# Patient Record
Sex: FEMALE | Race: AMERICAN INDIAN OR ALASKA NATIVE
[De-identification: names, ages, dates, MRNs, and addresses within clinical notes are randomized per-mention and may not be internally consistent; named-entity substitution may affect disease eponyms.]

---

## 2021-08-10 ENCOUNTER — HOSPITAL ENCOUNTER (EMERGENCY)
Dept: HOSPITAL 5 - ED | Age: 51
Discharge: HOME | End: 2021-08-10
Payer: MEDICAID

## 2021-08-10 VITALS — DIASTOLIC BLOOD PRESSURE: 95 MMHG | SYSTOLIC BLOOD PRESSURE: 155 MMHG

## 2021-08-10 DIAGNOSIS — J20.9: Primary | ICD-10-CM

## 2021-08-10 DIAGNOSIS — G43.909: ICD-10-CM

## 2021-08-10 DIAGNOSIS — J45.909: ICD-10-CM

## 2021-08-10 LAB
ALBUMIN SERPL-MCNC: 4 G/DL (ref 3.9–5)
ALT SERPL-CCNC: 9 UNITS/L (ref 7–56)
BASOPHILS # (AUTO): 0.1 K/MM3 (ref 0–0.1)
BASOPHILS NFR BLD AUTO: 1.1 % (ref 0–1.8)
BUN SERPL-MCNC: 11 MG/DL (ref 7–17)
BUN/CREAT SERPL: 14 %
CALCIUM SERPL-MCNC: 8.5 MG/DL (ref 8.4–10.2)
EOSINOPHIL # BLD AUTO: 0.2 K/MM3 (ref 0–0.4)
EOSINOPHIL NFR BLD AUTO: 3.2 % (ref 0–4.3)
HCT VFR BLD CALC: 26.4 % (ref 30.3–42.9)
HEMOLYSIS INDEX: 2
HGB BLD-MCNC: 7.5 GM/DL (ref 10.1–14.3)
LYMPHOCYTES # BLD AUTO: 1.3 K/MM3 (ref 1.2–5.4)
LYMPHOCYTES NFR BLD AUTO: 19.9 % (ref 13.4–35)
MCHC RBC AUTO-ENTMCNC: 28 % (ref 30–34)
MCV RBC AUTO: 61 FL (ref 79–97)
MONOCYTES # (AUTO): 0.7 K/MM3 (ref 0–0.8)
MONOCYTES % (AUTO): 11.4 % (ref 0–7.3)
PLATELET # BLD: 261 K/MM3 (ref 140–440)
RBC # BLD AUTO: 4.31 M/MM3 (ref 3.65–5.03)

## 2021-08-10 PROCEDURE — 71045 X-RAY EXAM CHEST 1 VIEW: CPT

## 2021-08-10 PROCEDURE — 85025 COMPLETE CBC W/AUTO DIFF WBC: CPT

## 2021-08-10 PROCEDURE — 99284 EMERGENCY DEPT VISIT MOD MDM: CPT

## 2021-08-10 PROCEDURE — 80053 COMPREHEN METABOLIC PANEL: CPT

## 2021-08-10 PROCEDURE — 96375 TX/PRO/DX INJ NEW DRUG ADDON: CPT

## 2021-08-10 PROCEDURE — 36415 COLL VENOUS BLD VENIPUNCTURE: CPT

## 2021-08-10 PROCEDURE — 96365 THER/PROPH/DIAG IV INF INIT: CPT

## 2021-08-10 NOTE — EMERGENCY DEPARTMENT REPORT
ED Shortness of Breath HPI





- General


Chief Complaint: Dyspnea/Respdistress


Stated Complaint: ASTHMA


Source: patient


Mode of arrival: Ambulatory


Limitations: No Limitations





- History of Present Illness


Initial Comments: 





Patient is a 49-year-old -American female with a history of asthma, 

migraine headaches, iron deficiency anemia and tobacco abuse who presents to the

ED with complaint of acute onset persistent shortness of breath, chest 

tightness, wheezing, persistent dry cough for the last 5 days despite using her 

albuterol inhaler at home.  Patient states that her symptoms got worse the last 

24 hours.  Patient also states that she ran out of her albuterol nebulizers at 

home.  Patient denies nausea, vomiting, dizziness, syncope, fever, chills, chest

pain, nasal and sinus congestion, sore throat, abdominal pain, diarrhea, 

headache, change in vision, back pain or palpitations.


MD Complaint: shortness of breath, cough, "asthma attack"


-: Sudden, days(s) (5)


Severity: moderate


Pain Scale: 5


Quality: sharp


Consistency: intermittent


Improves With: nothing


Worsens With: nothing


Known History Of: asthma


Context: allergen exposure


Associated Symptoms: denies other symptoms, cough


Treatments Prior to Arrival: bronchodilator





- Related Data


Home Oxygen Therapy: No


                                  Previous Rx's











 Medication  Instructions  Recorded  Last Taken  Type


 


ALBUTEROL NEB's [Proventil 0.083% 3 ml IH Q6H PRN #75 ml 08/10/21 Unknown Rx





NEBS]    


 


Albuterol Sulfate [Proventil Hfa] 1 - 2 puff IH Q6H PRN #1 hfa.aer.ad 08/10/21 

Unknown Rx


 


Azithromycin [Zithromax Z-MARINA] 250 mg PO DAILY #6 tablet 08/10/21 Unknown Rx


 


Benzonatate [Tessalon Perles] 100 mg PO Q8HR #30 capsule 08/10/21 Unknown Rx


 


Ibuprofen [Motrin] 600 mg PO Q8H PRN #30 tablet 08/10/21 Unknown Rx


 


Montelukast [Singulair] 10 mg PO QPM #30 tablet 08/10/21 Unknown Rx


 


methylPREDNISolone [Medrol 4MG 4 mg PO DAILY #21 tab.ds.pk 08/10/21 Unknown Rx





DOSEPAK (21 tabs)]    











                                    Allergies











Allergy/AdvReac Type Severity Reaction Status Date / Time


 


sulfamethoxazole Allergy  Hives Verified 08/10/21 17:11





[From Bactrim]     


 


trimethoprim [From Bactrim] Allergy  Hives Verified 08/10/21 17:11














ED Review of Systems


ROS: 


Stated complaint: ASTHMA


Other details as noted in HPI





Constitutional: malaise.  denies: chills, fever


Eyes: denies: eye pain, eye discharge, vision change


ENT: denies: ear pain, throat pain


Respiratory: cough, shortness of breath, wheezing


Cardiovascular: chest pain (chest tightness).  denies: palpitations


Endocrine: no symptoms reported


Gastrointestinal: denies: abdominal pain, nausea, vomiting, diarrhea


Genitourinary: denies: urgency, dysuria, discharge


Musculoskeletal: denies: back pain, joint swelling, arthralgia


Skin: denies: rash, lesions


Neurological: denies: headache, weakness, paresthesias


Psychiatric: denies: anxiety, depression


Hematological/Lymphatic: denies: easy bleeding, easy bruising





ED Past Medical Hx





- Past Medical History


Previous Medical History?: Yes


Hx Headaches / Migraines: Yes


Hx Asthma: Yes


Additional medical history: anemia





- Surgical History


Past Surgical History?: Yes


Additional Surgical History: ,





- Medications


Home Medications: 


                                Home Medications











 Medication  Instructions  Recorded  Confirmed  Last Taken  Type


 


ALBUTEROL NEB's [Proventil 0.083% 3 ml IH Q6H PRN #75 ml 08/10/21  Unknown Rx





NEBS]     


 


Albuterol Sulfate [Proventil Hfa] 1 - 2 puff IH Q6H PRN #1 hfa.aer.ad 08/10/21  

Unknown Rx


 


Azithromycin [Zithromax Z-MARINA] 250 mg PO DAILY #6 tablet 08/10/21  Unknown Rx


 


Benzonatate [Tessalon Perles] 100 mg PO Q8HR #30 capsule 08/10/21  Unknown Rx


 


Ibuprofen [Motrin] 600 mg PO Q8H PRN #30 tablet 08/10/21  Unknown Rx


 


Montelukast [Singulair] 10 mg PO QPM #30 tablet 08/10/21  Unknown Rx


 


methylPREDNISolone [Medrol 4MG 4 mg PO DAILY #21 tab.ds.pk 08/10/21  Unknown Rx





DOSEPAK (21 tabs)]     














ED Physical Exam





- General


Limitations: No Limitations


General appearance: alert, in no apparent distress





- Head


Head exam: Present: atraumatic, normocephalic, normal inspection





- Eye


Eye exam: Present: normal appearance, PERRL, EOMI





- ENT


ENT exam: Present: normal orophraynx, mucous membranes moist, TM's normal bilate

rally, normal external ear exam, other (Grossly congested nasal passages)





- Neck


Neck exam: Present: normal inspection, full ROM





- Respiratory


Respiratory exam: Present: wheezes (Diffuse coarse wheezes throughout).  Absent:

respiratory distress, rales, rhonchi, chest wall tenderness, accessory muscle 

use, decreased breath sounds, prolonged expiratory





- Cardiovascular


Cardiovascular Exam: Present: normal rhythm, tachycardia, normal heart sounds.  

Absent: systolic murmur, diastolic murmur, rubs, gallop





- GI/Abdominal


GI/Abdominal exam: Present: soft, normal bowel sounds.  Absent: tenderness, 

guarding, rebound, hyperactive bowel sounds, hypoactive bowel sounds, 

organomegaly





- Extremities Exam


Extremities exam: Present: normal inspection, full ROM, normal capillary refill





- Back Exam


Back exam: Present: normal inspection, full ROM.  Absent: tenderness, CVA t

enderness (R), CVA tenderness (L), muscle spasm, paraspinal tenderness, 

vertebral tenderness





- Neurological Exam


Neurological exam: Present: alert, oriented X3, CN II-XII intact, normal gait, 

reflexes normal





- Psychiatric


Psychiatric exam: Present: normal affect, normal mood





- Skin


Skin exam: Present: warm, dry, intact, normal color.  Absent: rash





ED Course


                                   Vital Signs











  08/10/21





  17:10


 


Temperature 97.8 F


 


Pulse Rate 100 H


 


Respiratory 18





Rate 


 


Blood Pressure 155/95


 


O2 Sat by Pulse 98





Oximetry 














ED Medical Decision Making





- Lab Data


Result diagrams: 


                                 08/10/21 17:50





                                 08/10/21 17:50





- Radiology Data


Radiology results: report reviewed, image reviewed





[facility]  


                                     [address] [place] [address]  


 


                                            XRay Report   


                                               Signed  


 


Patient: LEELEE RICARDO                                                        

       MR#: [id number]  


[id number]          


: 1971                                                                

Acct:[id number]      


 


Age/Sex: 49 / F                                                                

ADM Date: 08/10/21     


 


Loc: ED       


Attending Dr:   


 


 


Ordering Physician: LAVONNE GODWIN  


Date of Service: 08/10/21  


Procedure(s): XR chest 1V ap  


Accession Number(s): [id number]  


 


cc: LAVONNE GODWIN   


 


Fluoro Time In Minutes:   


 


XR chest 1V ap  


 


 INDICATION / CLINICAL INFORMATION:  


 DYSPNEA, ASTHMA  


 


 COMPARISON:   


 None available.  


 


 FINDINGS:  


 


 SUPPORT DEVICES: None.  


 


 HEART / MEDIASTINUM: No significant abnormality.   


 


 LUNGS / PLEURA: Lungs are clear.  Costophrenic sulci are sharp. No 

pneumothorax.  


 


 ADDITIONAL FINDINGS: No significant additional findings.  


 


 IMPRESSION:  


 1. No acute findings.  


 


 Signer Name: Chas Muniz MD   


 Signed: 8/10/2021 5:54 PM  


 Workstation Name: [id number]   


 


 


Transcribed By: CS  


Dictated By: Chas Muniz MD  


Electronically Authenticated By: Chas Muniz MD    


Signed Date/Time: 08/10/21 1754                                


 


 


 


DD/DT: 08/10/21 1754                                                            

 


TD/TT:





























- Medical Decision Making





This is a 49-year-old -American female with a history of asthma, migraine

headaches, iron deficiency anemia and tobacco abuse who presents to the ED with 

complaint of acute onset persistent shortness of breath, chest tightness, 

wheezing, persistent dry cough for the last 5 days despite using her albuterol 

inhaler at home.  Patient states that her symptoms got worse the last 24 hours. 

Patient also states that she ran out of her albuterol nebulizers at home.  In 

the ED, patient is alert and oriented x3 and is not in any distress but afebrile

and tachycardic in triage.  Chest x-ray shows no acute cardiopulmonary 

abnormalities or pneumonitis.  Lab test results were reviewed and are all 

nonactionable.  Patient received DuoNeb treatment in the ED, also received Solu-

Medrol 125 mg IV x1 as well as magnesium 2 g IV x1 with normal saline 1 L IV 

bolus.  On reevaluation, patient felt better, oxygen saturation is 100% and 

wheezing resolved as well.  Patient was therefore discharged home on medications

and advised to follow-up with her primary care physician in 5 to 7 days for 

reevaluation or return to the ED immediately if symptoms get worse.  Patient was

also counseled on the importance of quitting tobacco abuse to improve on her 

symptoms.  Patient verbalized understanding.





- Differential Diagnosis


asthma; bronchitis; pneumonia; URI


Critical care attestation.: 


If time is entered above; I have spent that time in minutes in the direct care 

of this critically ill patient, excluding procedure time.








ED Disposition


Clinical Impression: 


 Acute bronchitis with asthma with acute exacerbation, Shortness of breath





Disposition:  TO HOME OR SELFCARE


Is pt being admited?: No


Does the pt Need Aspirin: No


Condition: Stable


Instructions:  Shortness of Breath, Adult, Easy-to-Read, Cough, Adult, 

Easy-to-Read, Acute Bronchitis, Adult, Easy-to-Read, Asthma, Adult, 

Easy-to-Read, Acute Bronchitis (ED)


Additional Instructions: 


All lab test results were reviewed and are all nonactionable.  Chest x-ray shows

no acute cardiopulmonary abnormalities or pneumonitis.  Your symptoms are likely

due to acute exacerbation of your asthma or bronchitis.  Therefore take 

medications with food, drink plenty of fluids and follow-up with your primary c

are physician in 5 to 7 days for reevaluation.  Return to the ED immediately if 

symptoms get worse.


Prescriptions: 


methylPREDNISolone [Medrol 4MG DOSEPAK (21 tabs)] 4 mg PO DAILY #21 tab.ds.pk


Ibuprofen [Motrin] 600 mg PO Q8H PRN #30 tablet


 PRN Reason: Pain


ALBUTEROL NEB's [Proventil 0.083% NEBS] 3 ml IH Q6H PRN #75 ml


 PRN Reason: Wheezing


Albuterol Sulfate [Proventil Hfa] 1 - 2 puff IH Q6H PRN #1 hfa.aer.ad


 PRN Reason: Shortness Of Breath


Montelukast [Singulair] 10 mg PO QPM #30 tablet


Benzonatate [Tessalon Perles] 100 mg PO Q8HR #30 capsule


Azithromycin [Zithromax Z-MARINA] 250 mg PO DAILY #6 tablet


Referrals: 


PAUL OJEDA MD [Staff Physician] - 7-10 days


Time of Disposition: 19:48


Print Language: ENGLISH

## 2021-08-10 NOTE — XRAY REPORT
XR chest 1V ap



INDICATION / CLINICAL INFORMATION:

DYSPNEA, ASTHMA



COMPARISON: 

None available.



FINDINGS:



SUPPORT DEVICES: None.



HEART / MEDIASTINUM: No significant abnormality. 



LUNGS / PLEURA: Lungs are clear.  Costophrenic sulci are sharp. No pneumothorax.



ADDITIONAL FINDINGS: No significant additional findings.



IMPRESSION:

1. No acute findings.



Signer Name: Chas Muniz MD 

Signed: 8/10/2021 5:54 PM

Workstation Name: [id number]

## 2021-12-01 ENCOUNTER — HOSPITAL ENCOUNTER (EMERGENCY)
Dept: HOSPITAL 5 - ED | Age: 51
Discharge: HOME | End: 2021-12-01
Payer: MEDICAID

## 2021-12-01 VITALS — DIASTOLIC BLOOD PRESSURE: 78 MMHG | SYSTOLIC BLOOD PRESSURE: 189 MMHG

## 2021-12-01 DIAGNOSIS — L02.91: Primary | ICD-10-CM

## 2021-12-01 PROCEDURE — 99282 EMERGENCY DEPT VISIT SF MDM: CPT

## 2021-12-01 PROCEDURE — 99283 EMERGENCY DEPT VISIT LOW MDM: CPT

## 2021-12-01 NOTE — EMERGENCY DEPARTMENT REPORT
ED General Adult HPI





- General


Chief complaint: Urogenital-Female


Stated complaint: VAGINAL PROBLEM


Time Seen by Provider: 21 12:28


Source: patient


Mode of arrival: Ambulatory


Limitations: No Limitations





- History of Present Illness


Initial comments: 





Is a pleasant 51-year-old female presents emergency department chief complaint 

of a painful bump above her vagina has been present for the past few days.  She 

has a history of abscess here in the past and has been trying home remedies with

no relief of her symptoms.  She denies any associated fever, chills or night 

sweats, headache, dizziness, or vision, nausea, vomiting, diarrhea, chest pain, 

shortness of breath, weakness or any other associated symptoms.





- Related Data


                                  Previous Rx's











 Medication  Instructions  Recorded  Last Taken  Type


 


ALBUTEROL NEB's [Proventil 0.083% 3 ml IH Q6H PRN #75 ml 08/10/21 Unknown Rx





NEBS]    


 


Albuterol Sulfate [Proventil Hfa] 1 - 2 puff IH Q6H PRN #1 hfa.aer.ad 08/10/21 

Unknown Rx


 


Azithromycin [Zithromax Z-MARINA] 250 mg PO DAILY #6 tablet 08/10/21 Unknown Rx


 


Benzonatate [Tessalon Perles] 100 mg PO Q8HR #30 capsule 08/10/21 Unknown Rx


 


Ibuprofen [Motrin] 600 mg PO Q8H PRN #30 tablet 08/10/21 Unknown Rx


 


Montelukast [Singulair] 10 mg PO QPM #30 tablet 08/10/21 Unknown Rx


 


methylPREDNISolone [Medrol 4MG 4 mg PO DAILY #21 tab.ds.pk 08/10/21 Unknown Rx





DOSEPAK (21 tabs)]    


 


Acetaminophen/Codeine [Tylenol 1 tab PO Q6H PRN #12 tab 21 Unknown Rx





/Codeine # 3 tab]    


 


Clindamycin [Clindamycin CAP] 300 mg PO Q6H #40 capsule 21 Unknown Rx


 


Triamcinolone 0.5% [Kenalog 0.5% 1 applic TP TID #1 tube 21 Unknown Rx





CREAM]    











                                    Allergies











Allergy/AdvReac Type Severity Reaction Status Date / Time


 


sulfamethoxazole Allergy  Hives Verified 08/10/21 17:11





[From Bactrim]     


 


trimethoprim [From Bactrim] Allergy  Hives Verified 08/10/21 17:11














ED Review of Systems


ROS: 


Stated complaint: VAGINAL PROBLEM


Other details as noted in HPI





Comment: All other systems reviewed and negative


Constitutional: denies: chills, fever


Eyes: denies: eye pain, eye discharge, vision change


ENT: denies: ear pain, throat pain


Respiratory: denies: cough, shortness of breath, wheezing


Cardiovascular: denies: chest pain, palpitations


Endocrine: no symptoms reported


Gastrointestinal: denies: abdominal pain, nausea, diarrhea


Genitourinary: denies: urgency, dysuria, discharge


Musculoskeletal: denies: back pain, joint swelling, arthralgia


Skin: as per HPI.  denies: rash, lesions


Neurological: denies: headache, weakness, paresthesias


Psychiatric: denies: anxiety, depression


Hematological/Lymphatic: denies: easy bleeding, easy bruising





ED Past Medical Hx





- Past Medical History


Previous Medical History?: Yes


Hx Headaches / Migraines: Yes


Hx Asthma: Yes


Additional medical history: anemia





- Surgical History


Additional Surgical History: ,





- Medications


Home Medications: 


                                Home Medications











 Medication  Instructions  Recorded  Confirmed  Last Taken  Type


 


ALBUTEROL NEB's [Proventil 0.083% 3 ml IH Q6H PRN #75 ml 08/10/21  Unknown Rx





NEBS]     


 


Albuterol Sulfate [Proventil Hfa] 1 - 2 puff IH Q6H PRN #1 hfa.aer.ad 08/10/21  

Unknown Rx


 


Azithromycin [Zithromax Z-MARINA] 250 mg PO DAILY #6 tablet 08/10/21  Unknown Rx


 


Benzonatate [Tessalon Perles] 100 mg PO Q8HR #30 capsule 08/10/21  Unknown Rx


 


Ibuprofen [Motrin] 600 mg PO Q8H PRN #30 tablet 08/10/21  Unknown Rx


 


Montelukast [Singulair] 10 mg PO QPM #30 tablet 08/10/21  Unknown Rx


 


methylPREDNISolone [Medrol 4MG 4 mg PO DAILY #21 tab.ds.pk 08/10/21  Unknown Rx





DOSEPAK (21 tabs)]     


 


Acetaminophen/Codeine [Tylenol 1 tab PO Q6H PRN #12 tab 21  Unknown Rx





/Codeine # 3 tab]     


 


Clindamycin [Clindamycin CAP] 300 mg PO Q6H #40 capsule 21  Unknown Rx


 


Triamcinolone 0.5% [Kenalog 0.5% 1 applic TP TID #1 tube 21  Unknown Rx





CREAM]     














ED Physical Exam





- General


Limitations: No Limitations


General appearance: alert, in no apparent distress





- Head


Head exam: Present: atraumatic, normocephalic





- Eye


Eye exam: Present: normal appearance, PERRL, EOMI


Pupils: Present: normal accommodation





- ENT


ENT exam: Present: normal exam, normal orophraynx, mucous membranes moist





- Neck


Neck exam: Present: normal inspection, full ROM.  Absent: tenderness, 

meningismus





- Respiratory


Respiratory exam: Present: normal lung sounds bilaterally.  Absent: respiratory 

distress, wheezes, rales, rhonchi, stridor





- Cardiovascular


Cardiovascular Exam: Present: regular rate, normal rhythm, normal heart sounds. 

Absent: systolic murmur, diastolic murmur, rubs, gallop





- GI/Abdominal


GI/Abdominal exam: Present: soft, normal bowel sounds.  Absent: distended, 

tenderness, guarding, rebound, rigid





- 


External exam: Present: swelling, other (There is a 3 cm fluctuant mass just 

above the vagina.  Exam chaperoned by MRACO Stevenson)





- Extremities Exam


Extremities exam: Present: normal inspection





- Back Exam


Back exam: Present: normal inspection





- Neurological Exam


Neurological exam: Present: alert, oriented X3





- Psychiatric


Psychiatric exam: Present: normal affect, normal mood





- Skin


Skin exam: Present: warm, dry, intact, normal color.  Absent: rash





ED Course


                                   Vital Signs











  21





  12:17


 


Temperature 98.3 F


 


Pulse Rate 86


 


Respiratory 16





Rate 


 


Blood Pressure 189/78





[Right] 


 


O2 Sat by Pulse 100





Oximetry 














- I & D


  ** Vagina


Type of Procedure: Complex


Blade Size: 11


I & D Procedure: betadine prep, sterile drapes applied, sterile dressing 

applied, gauze wick placed


Progress: 





The patient gave verbal consent.  I then cleaned the area with Betadine prep and

 anesthetized the area with 1% lidocaine without epinephrine.  1/2 cm incision 

was then made over the area of most fluctuance and a large amount of purulent 

drainage was expressed.  There was pain with the loculation the patient did not 

tolerate any further loculation or irrigation.  Suspect there may be another 

pocket of infection there but the patient would declined to allow me to do any 

further expression or the loculation due to pain.  I offered to renumb but she 

declined.  I did pack the remaining wound with quarter inch iodoform gauze which

 she tolerated well.  The procedure was tolerated with difficulty but overall 

large amount of pus was drained.  There was less than 5 mL of blood loss. 





ED Medical Decision Making





- Medical Decision Making





Abscess was drained although I suspect there may be one more pocket of pus in 

the wound the patient would not allow me to further deloculated.  She will be 

treated with clindamycin, warm compresses, Tylenol with codeine for pain and 

topical triamcinolone due to history of eczema that she reports this is helped 

her in the past.  She understood that it is possible there may be another pocket

 of pus and this may get worse again and that she may need to follow-up with 

either her OB/GYN or primary care doctor or surgeon.  She understood and I 

recommend warm compress return to the ER with any change in her symptoms.  She 

verbalized understand the diagnosis, troponin follow structures all questions 

were answered.





- Differential Diagnosis


Abscess, cellulitis, folliculitis, eczema


Critical care attestation.: 


If time is entered above; I have spent that time in minutes in the direct care 

of this critically ill patient, excluding procedure time.








ED Disposition


Clinical Impression: 


 Abscess





Disposition: 01 HOME / SELF CARE / HOMELESS


Is pt being admited?: No


Condition: Stable


Instructions:  Skin Abscess


Prescriptions: 


Clindamycin [Clindamycin CAP] 300 mg PO Q6H #40 capsule


Triamcinolone 0.5% [Kenalog 0.5% CREAM] 1 applic TP TID #1 tube


Acetaminophen/Codeine [Tylenol /Codeine # 3 tab] 1 tab PO Q6H PRN #12 tab


 PRN Reason: Pain , Severe (7-10)


Referrals: 


JOVAN ESCALANTE MD [Primary Care Provider] - 3-5 Days


[facility] [Provider Group] - 3-5 Days


PAUL OJEDA MD [Staff Physician] - 3-5 Days

## 2023-08-17 ENCOUNTER — OFFICE VISIT (OUTPATIENT)
Dept: URBAN - NONMETROPOLITAN AREA CLINIC 2 | Facility: CLINIC | Age: 53
End: 2023-08-17